# Patient Record
Sex: MALE | Race: BLACK OR AFRICAN AMERICAN | NOT HISPANIC OR LATINO
[De-identification: names, ages, dates, MRNs, and addresses within clinical notes are randomized per-mention and may not be internally consistent; named-entity substitution may affect disease eponyms.]

---

## 2020-07-07 ENCOUNTER — DASHBOARD ENCOUNTERS (OUTPATIENT)
Age: 70
End: 2020-07-07

## 2020-07-08 ENCOUNTER — OFFICE VISIT (OUTPATIENT)
Dept: URBAN - METROPOLITAN AREA CLINIC 98 | Facility: CLINIC | Age: 70
End: 2020-07-08
Payer: MEDICARE

## 2020-07-08 DIAGNOSIS — R63.4 WEIGHT LOSS: ICD-10-CM

## 2020-07-08 DIAGNOSIS — E55.9 HYPOVITAMINOSIS D: ICD-10-CM

## 2020-07-08 DIAGNOSIS — R10.84 ABDOMINAL PAIN, GENERALIZED: ICD-10-CM

## 2020-07-08 DIAGNOSIS — K50.80 CROHN'S DISEASE OF BOTH SMALL AND LARGE INTESTINE: ICD-10-CM

## 2020-07-08 DIAGNOSIS — E05.90 HYPERTHYROIDISM: ICD-10-CM

## 2020-07-08 PROCEDURE — 99214 OFFICE O/P EST MOD 30 MIN: CPT | Performed by: INTERNAL MEDICINE

## 2020-07-08 PROCEDURE — 82607 VITAMIN B-12: CPT | Performed by: INTERNAL MEDICINE

## 2020-07-08 PROCEDURE — 82397 CHEMILUMINESCENT ASSAY: CPT | Performed by: INTERNAL MEDICINE

## 2020-07-08 PROCEDURE — 99205 OFFICE O/P NEW HI 60 MIN: CPT | Performed by: INTERNAL MEDICINE

## 2020-07-08 PROCEDURE — 82306 VITAMIN D 25 HYDROXY: CPT | Performed by: INTERNAL MEDICINE

## 2020-07-08 PROCEDURE — 80299 QUANTITATIVE ASSAY DRUG: CPT | Performed by: INTERNAL MEDICINE

## 2020-07-08 RX ORDER — OMEPRAZOLE 40 MG/1
TAKE 1 CAPSULE (40 MG) BY ORAL ROUTE ONCE DAILY BEFORE A MEAL CAPSULE, DELAYED RELEASE PELLETS ORAL 1
Qty: 90 | Refills: 0 | COMMUNITY
Start: 1900-01-01

## 2020-07-08 RX ORDER — AMLODIPINE BESYLATE 10 MG/1
TAKE 1 TABLET (10 MG) BY ORAL ROUTE ONCE DAILY TABLET ORAL 1
Qty: 0 | Refills: 0 | COMMUNITY
Start: 1900-01-01

## 2020-07-08 RX ORDER — LISINOPRIL AND HYDROCHLOROTHIAZIDE TABLETS 20; 12.5 MG/1; MG/1
TAKE 1 TABLET BY ORAL ROUTE ONCE DAILY TABLET ORAL 1
Qty: 0 | Refills: 0 | COMMUNITY
Start: 1900-01-01

## 2020-07-08 RX ORDER — USTEKINUMAB 90 MG/ML
INJECT 1 MILLILITER (90 MG) BY SUBCUTANEOUS ROUTE EVERY 8 WEEKS INJECTION, SOLUTION SUBCUTANEOUS
Qty: 1 | Refills: 6 | COMMUNITY
Start: 1900-01-01

## 2020-07-08 RX ORDER — MERCAPTOPURINE 50 MG/1
TAKE 2 TABLETS PO DAILY TABLET ORAL 1
Qty: 60 | Refills: 5 | COMMUNITY
Start: 1900-01-01

## 2020-07-08 NOTE — HPI-OTHER HISTORIES
7/8/20  Stelara Rx 90 mg q 8 weeks No abd pain 3-4 stools a day. Formed stools, occas loose. No blood Energy good.  Few years'  3 past resections 1982 1995 2006  Lost to 290 Trying to reduce intake Eating carrots  Tried walking but has a partial right knee  yesterday got shots for hep A/B and for Shingles   3/4/20  2/27/20 labs Hb 12.1 and ESR 31/30---  This is a scheduled appointment for this patient, a 69 year old /Black male, after a previous visit approximately Crohn's disease.     68 yo male comes in with tevin wife Yeni for 4 month f/u. Doing great. No pain. 2 stools a day and some loose.  New dx hyperthyroidism.  stelara monotherapy 90 mg q 8 weeks  He is on 6MP 100 mg a day which was added when he was on Stelara and it made no difference and so we wuill taper and we will tper by 1/2 pill q 2-3 months.  Will get stelara level before 4/27 and ck 6TG level every 2-3 months while tapering.  Off prednisone.  Joints better.   Knees are a little.  2/27/20 labs show hb 12.1 and nl cmp  ====== 12/6/2020  68 yo male comes in for 4 week f/u.  [facility] and no active Crohn's was seen..  There was an isolated ulcer in the zan TI and in transverse colon.  Has now had iv dose and 1 sq dose of Stelara  Now 2 stools a day, dowh from 7-8.  RLQ pain much less than 1 month ago.   ============== 11/11/19   68 yo male with Crohn's kitty comes in with his wife Yeni for urgent follow up.  Last ov was 9/5.  He called me early yesterday 5:13 am about an emergency obstruction.  He was in severe RLQ.  Ate Saturday, but not alot, as he was afraid to eat and had not eaten well.  Pain is no better.  [facility] to [facility] 8 due to severe RLQ and lower abdominal pain.  Nausea but no vooming.  MRE -- nl  Last prednisone was 10/28  On clear liquids  Eating makes pain worse.  Been on 6 MP   100 mg a day     = 9/5/19  68 yo male with Crohn's disesae  comes in for 1 week f/u.  Had colonoscopy by Dr. Richardson 2 days ago and poor prep but right colon ulcers and ileum not evaluated.  MRE showed no sign of CD.  Blood work from 8/29 was wnl.  2 stools a day without blood  ---Addendum from the 9/5 Addendum 9/13/19  Spoke with Simon by phone. He is anxious about switching to Stelara. Told him to hold the Humira dose today. Continue 6 MP and flgy for now. F/u in 2 months. Give the medicine time to work. Low roughage diet. Reassured.  10/11/19 Wife Yeni called. 7 stools yesterday. Got Stelara on 9/16. Did not help.  Pain is worse. Needs pred 20 to 40 mg and taper.      = 8/29/19  68 yo male with Crohn's disease comes in for f/u.  On Humira 40 mg every 5 days.  6MP 50 mg 2 per day.  Went to  ER2 weeks in a row to understnad abdominal pain in the rlq.  "Stiff person syndrome"  Dr. Garcia seeing cardiology Diarrhea Abd pain  Limited CT with iv only., past jazlyn po.  in ER twice in 2 weeks.  ======== 4/18/19  67 yo male on Humira 40 mg sq q 5 days.   Gets med [facility] and no issue.  ADA level on 40 q week was 3.5 and needed dose increase.  Doses next on Saturday and so will wait until nex Thursday dose and get a 5 day trough.  No pain diarrhea or bleeding.  AGWS.  No fcs.  New dx of CHF and is on lasix. [facility].    ========== October 2018  67 yo male with CD dx 1982 s/p 3 resections and s/p 2 fistula and doing great on Humira 40 mg a week. On 6 MP 1 pill per day. Doses on Tuesday.  ADA level April 18 2018 was 3.5 and I am worried about potential for loss of response.  No EIM.  Knees are a problem.  Walks with a can.  Weight today is 325 and weight 3 years ago was 317.  5'8".  Weighed 215 at age 32.  Gastric bypass is an option, but may be increased.  Needs diet mod but it has not been successful.  Walks a half a block.    ======= 4/18/18  68 yo male with Crohn's disease and 3 resections and doing well.  Had US due to gynecomastia.  Lymph node swelling last year, cause unknown.  No pain. Non daily diarrthea.  AGWS.  [facility].  /They have a Rx.  Due yesterday for dose ----- to get from [facility].  CTEd 2017 did not show active CD but did show 2 left adrenal lesions that have been checked at the [facility].  FEB 2018 colonoscopy did not show active disease - ileum not examine.  Needs better prep next time.      Past Medical History  Disease Name Date Onset Notes  Abdominal pain, generalized 08/29/2019 --   Atrial fibrillation 08/28/2016 --   Crohn disease [facility] 06/02/2016 - 10/07/2015 -   GERD [facility] --   Hepatitis A Immunization --  --   Hyperthyroidism 03/04/2020 --   Hypovitaminosis D 05/03/2018 --   Influenza Immunization [facility] 06/02/2016 -   Pneumococcal Immunization 10/16/2017 06/02/2016 -   Weight loss 11/11/2019 --       Past Surgical History  Procedure Name Date Notes  Bladder Surgery [facility] --   Colon Surgery [id number] 06/02/2016 -   Colonoscopy 10/2019 03/04/2020 - 12/06/2019 - 11/11/2019 - [facility] 09/05/2019 - 04/18/2019 - 10/18/2018 - 04/18/2018 - 06/02/2016 -   EGD 2/2017 03/04/2020 - 12/06/2019 - 11/11/2019 - 09/05/2019 - 04/18/2019 - 10/18/2018 -   Joint Replacement [facility] 06/02/2016 -       Medication List  Name Date Started Instructions  amlodipine 10 mg oral tablet  take 1 tablet (10 mg) by oral route once daily  blood thinner  --   dicyclomine 20 mg oral tablet 11/11/2019 take 1-2 tablets (20-40 mg) by oral route 4 times per day prn  hydralazine oral  --   lisinopril-hydrochlorothiazide 20-12.5 mg oral tablet  take 1 tablet by oral route once daily  mercaptopurine 50 mg oral tablet  take 2 tablets PO daily  omeprazole 40 mg oral capsule,delayed release(DR/EC)  take 1 capsule (40 mg) by oral route once daily before a meal  Stelara 90 mg/mL subcutaneous syringe  inject 1 milliliter (90 mg) by subcutaneous route every 8 weeks      Allergy List  Allergen Name Date Reaction Notes  NO KNOWN DRUG ALLERGIES --  --  06/02/2016 -   No Known Environmental Allergies --  --  06/02/2016 -   No Known Food Allergies --  --  06/02/2016 -       Family Medical History  Disease Name Relative/Age Notes  Family history of acid reflux/GERD Daughter/  --       Social History  Finding Status Start/Stop Quantity Notes  Alcohol Never --/-- --  03/04/2020 - 12/06/2019 - 11/11/2019 - 09/05/2019 - 08/29/2019 - 04/18/2019 - 10/18/2018 - 04/18/2018 - 10/19/2017 - 04/03/2017 - 01/26/2017 - 06/02/2016 -   Denies illicit substance abuse --  --/-- --  03/04/2020 - 12/06/2019 - 11/11/2019 - 09/05/2019 - 04/18/2019 -   Tobacco Never --/-- --  03/04/2020 12/06/2019 - 11/11/2019 - 09/05/2019 - 08/29/2019 - 04/18/2019 - 10/18/2018 - 04/18/2018 - 10/19/2017 - 04/03/2017 - 01/26/2017 - 09/12/2016 - 06/02/2016 -       Review of Systems  ConstitutionalDenies : body aches, chills, fatigue, fever, loss of appetite, malaise, night sweats, weight gain, weight loss  EyesDenies : blurred vision, visual changes  HENTDenies : Ear Pain/Ringing, hearing loss, Mouth Ulcers/Sores, nose bleeds, problems with gums/teeth, trouble swallowing  CardiovascularDenies : chest pain, leaky heart valves, heart murmur, heart racing/skipping, high blood pressure, palpitations  RespiratoryDenies : chronic cough, shortness of breath, wheezing or asthma symptoms  GastrointestinalDenies : Abdominal Pain/discomfort, Anal/Rectal Pain or Itching, Anal Spasm, Black Stool, Bloating/belching/gaseouness, Change of Bowel Habit, Constipation, Diarrhea/Loose Stool, Difficulty in Swallowing, Heartburn/esophageal reflux, Hemorrhoids, Indigestion, Mucus in Stool, Nausea/vomiting, Rectal Bleeding, Unintentional Weight Loss  GenitourinaryDenies : Blood in Urine, Burning/pain with Urination, frequency, Recent/frequent UTI, Kidney Stones  IntegumentDenies : itching/dry skin, jaundice, rashes, bumps or sores  NeurologicDenies : headaches, dizziness/vertigo, head trauma/injury, recent numbness/weakness, seizures  MusculoskeletalDenies : back pain, decreased range of motion, joint pain/arthritis, Problems Walking/calf or leg pain  EndocrineDenies : bruise easily, excessive thirst, heat/cold intolerance, history of high or low blood sugar  PsychiatricDenies : anxiety, changes in sleep pattern, depression, loss of memory  Heme-LymphDenies : Bleeding Problems, Enlarged Nodes/Swolen Glands, excessive bruising, history of anemia  Allergic-ImmunologicDenies : seasonal allergies    Vitals  Date Time BP Position Site L\R Cuff Size HR RR TEMP (F) WT  HT  BMI kg/m2 BSA m2 O2 Sat HC     03/04/2020 09:14 /93 Sitting    94 - R  97.6 299lbs 2oz 5'  8" 45.48 2.55        Physical Examination  ConstitutionalAppearance : Well nourished, well developed patient in no distress. Ambulating without difficulty.  Ability to Communicate : Normal communication ability  EyesConjunctivae and Eyelids : Conjunctivae and eyelids appear normal  Sclerae : White without injection  HENTHead :  Inspection : Normocephalic and atraumatic  Face :  Inspection : Face within normal limits  Ears :  External Ears : External ears within normal limits  Nose/Nasopharynx :  External Nose : External nose normal appearance, nares patent, no nasal discharge  Mouth and Throat :  General : Oral cavity appearance normal, breath odor normal  Lips : Appearance normal  NeckInspection and Palpation : Normal appearance without tenderness on palpation or deformities, trachea midline  Thyroid : Normal size without tenderness, nodules or masses  Jugular Veins : no JVD  ChestInspection of Chest : No lesions, deformities or traumatic injuries present. No significant scars are present.  Respiratory Effort : Breathing is unlabored without accessory muscle use  Palpation of Chest : Chest wall non-tender with no masses present. Tactile fremitus is normal  Auscultation : Normal breath sounds  CardiovascularHeart :  Auscultation : Heart rate is regular with normal rhythm. No murmurs are heard. No edema.  GastrointestinalAbdominal Exam : Abdomen soft without rigidity or guarding, abdomen non-tender to palpation, normal bowel sounds, no masses present, non-distended  Liver and Spleen : No hepatomegaly present. Liver is non-tender to palpation and spleen is not palpable.  LymphaticNeck : No lymphadenopathy present  Axillae : No lymphadenopathy present  Groin : No lymphadenopathy present  MusculoskeletalGait and Station : Normal Gait, normal station  Neck/Spine/Pelvis : No tenderness of deformities present.  SkinGeneral Inspection : No rashes, lesions or areas of discoloration present. Skin turgor is normal.  General Palpation : No abnormalities, masses or tenderness on palpation.  Neurologic and PsychiatricOrientation : Oriented to person, place and time  Sensation : Normal sensation  Memory : Short and long term memory intact.  Mood and Affect : Normal mood with an appropriate affect      Assessment  Crohn's Disease     555.2  Abdominal pain, generalized     789.07/R10.84  Hypovitaminosis D     268.9/E55.9  Weight loss     783.21/R63.4  Crohn disease     555.9/K50.90 06/02/2016 -   10/07/2015 -    Hyperthyroidism     242.90/E05.90    Plan  OrdersPatient not identified as an unhealthy alcohol user when screene () - - 03/04/2020  Influenza immunization administered or previously received () - - 03/04/2020  BMI screening above normal () - - 03/04/2020  Current tobacco non-user (CAD, cap, COPD, PV) (dm) (IBD) (1036F, ) - - 03/04/2020  Colorectal cancer screening results documented and reviewed (PV) (3017F) - - 03/04/2020  Documentation of current medications. () - - 03/04/2020  CMP (comprehensive metabolic panel) (48992) - - 03/04/2020  Sed rate (06491) - - 03/04/2020  C-reactive protein (53176) - - 03/04/2020  CBC with diff (58204) - - 03/04/2020  Thiopurine metabolites measurement. (39948, 30182) - - 03/04/2020  Ustekinumab and Anti-Ustekinumab Antibody ([facility] Only) (23483, 56543) - - 03/04/2020  MedicationsMedications have been Reconciled  Transition of Care or Provider Policy  InstructionsI have documented a list of current medications and reviewed it with the patient.  I encouraged the patient to diet and exercise.  DispositionReturn To Clinic in 4 Months  CorrespondenceCC this document (Justin Haywood MD) - 3/4/2020    weight reduction decrease 6MP to 50 mg and then 25 and d/c-----More risk than benefit.         Electronically Signed by: James Palafox MD -Author on March 4, 2020 10:07:10 AM

## 2020-07-09 ENCOUNTER — TELEPHONE ENCOUNTER (OUTPATIENT)
Dept: URBAN - METROPOLITAN AREA CLINIC 92 | Facility: CLINIC | Age: 70
End: 2020-07-09

## 2020-07-09 RX ORDER — USTEKINUMAB 90 MG/ML
INJECT 1 MILLILITER (90 MG) BY SUBCUTANEOUS ROUTE EVERY 8 WEEKS INJECTION, SOLUTION SUBCUTANEOUS
Qty: 1 KIT | Refills: 6
Start: 2020-07-09 | End: 2021-08-05

## 2020-07-13 ENCOUNTER — TELEPHONE ENCOUNTER (OUTPATIENT)
Dept: URBAN - METROPOLITAN AREA CLINIC 92 | Facility: CLINIC | Age: 70
End: 2020-07-13